# Patient Record
Sex: FEMALE | Race: WHITE | Employment: OTHER | URBAN - NONMETROPOLITAN AREA
[De-identification: names, ages, dates, MRNs, and addresses within clinical notes are randomized per-mention and may not be internally consistent; named-entity substitution may affect disease eponyms.]

---

## 2017-04-18 ENCOUNTER — MED REC SCAN ONLY (OUTPATIENT)
Dept: FAMILY MEDICINE CLINIC | Facility: CLINIC | Age: 82
End: 2017-04-18

## 2017-07-20 ENCOUNTER — TELEPHONE (OUTPATIENT)
Dept: FAMILY MEDICINE CLINIC | Facility: CLINIC | Age: 82
End: 2017-07-20

## 2017-07-20 DIAGNOSIS — C50.011 MALIGNANT NEOPLASM INVOLVING BOTH NIPPLE AND AREOLA OF RIGHT BREAST IN FEMALE, UNSPECIFIED ESTROGEN RECEPTOR STATUS (HCC): Primary | ICD-10-CM

## 2017-07-20 NOTE — TELEPHONE ENCOUNTER
Patient is due for yearly breast exam, Last breast exam was 2014- left message with Xavier Payor for patient to call us back. Order pending to ok with Dr. Carlitos Rubi.

## 2017-08-01 NOTE — TELEPHONE ENCOUNTER
Order for bra and prosthesis did not go through  Please refax to [facility] [phone]

## 2017-08-17 ENCOUNTER — TELEPHONE (OUTPATIENT)
Dept: FAMILY MEDICINE CLINIC | Facility: CLINIC | Age: 82
End: 2017-08-17

## 2017-08-17 NOTE — TELEPHONE ENCOUNTER
Called pharmacy to verify what additional information is needed, spoke to Coy she states nevermind they received paperwork needed. She states she will get supplies out to patient.

## 2017-08-25 ENCOUNTER — TELEPHONE (OUTPATIENT)
Dept: FAMILY MEDICINE CLINIC | Facility: CLINIC | Age: 82
End: 2017-08-25

## 2017-08-25 ENCOUNTER — OFFICE VISIT (OUTPATIENT)
Dept: FAMILY MEDICINE CLINIC | Facility: CLINIC | Age: 82
End: 2017-08-25

## 2017-08-25 VITALS
WEIGHT: 138.38 LBS | HEIGHT: 64 IN | SYSTOLIC BLOOD PRESSURE: 150 MMHG | BODY MASS INDEX: 23.63 KG/M2 | HEART RATE: 63 BPM | TEMPERATURE: 98 F | DIASTOLIC BLOOD PRESSURE: 62 MMHG | OXYGEN SATURATION: 98 %

## 2017-08-25 DIAGNOSIS — R22.31 MASS OF AXILLA, RIGHT: Primary | ICD-10-CM

## 2017-08-25 PROCEDURE — 99213 OFFICE O/P EST LOW 20 MIN: CPT | Performed by: SURGERY

## 2017-08-25 RX ORDER — CLOPIDOGREL BISULFATE 75 MG/1
75 TABLET ORAL DAILY
COMMUNITY
Start: 2017-05-09

## 2017-08-25 RX ORDER — SENNA PLUS 8.6 MG/1
8.6 TABLET ORAL DAILY
COMMUNITY
Start: 2011-12-01

## 2017-08-25 RX ORDER — PANTOPRAZOLE SODIUM 40 MG/1
40 TABLET, DELAYED RELEASE ORAL DAILY
COMMUNITY
Start: 2017-08-09

## 2017-08-25 RX ORDER — FUROSEMIDE 20 MG/1
20 TABLET ORAL AS NEEDED
COMMUNITY
Start: 2015-12-31

## 2017-08-25 RX ORDER — SWAB
1 SWAB, NON-MEDICATED MISCELLANEOUS DAILY
COMMUNITY
Start: 2014-05-28

## 2017-08-25 NOTE — TELEPHONE ENCOUNTER
RAJIVTCB for [facility] Scheduling to schedule the pt for 8/31 around 2 pm.     Liliam from [facility] called back and we scheduled the pt for 8/31/17 at 2 pm. They ask the pt come about 5-10 mins early to  Appt. NA at the pt's house. Will try on Monday.  raji

## 2017-09-01 ENCOUNTER — TELEPHONE (OUTPATIENT)
Dept: FAMILY MEDICINE CLINIC | Facility: CLINIC | Age: 82
End: 2017-09-01

## 2017-09-01 NOTE — TELEPHONE ENCOUNTER
Per Dr Nivia Closs u/s is normal. Mass is fat not lymph node and no further testing recommended.  Pt aware and v/u. rezaw

## 2018-12-27 ENCOUNTER — TELEPHONE (OUTPATIENT)
Dept: SURGERY | Facility: CLINIC | Age: 83
End: 2018-12-27

## 2018-12-27 NOTE — TELEPHONE ENCOUNTER
Called patient to schedule an appointment for her overdue yearly breast exam.  Patient stated she just awoke and will call back later to schedule her appointment. Phone number provided.